# Patient Record
Sex: FEMALE | Race: WHITE | NOT HISPANIC OR LATINO | Employment: FULL TIME | URBAN - METROPOLITAN AREA
[De-identification: names, ages, dates, MRNs, and addresses within clinical notes are randomized per-mention and may not be internally consistent; named-entity substitution may affect disease eponyms.]

---

## 2017-03-29 ENCOUNTER — COMMUNICATION - HEALTHEAST (OUTPATIENT)
Dept: SCHEDULING | Facility: CLINIC | Age: 32
End: 2017-03-29

## 2017-04-04 ENCOUNTER — COMMUNICATION - HEALTHEAST (OUTPATIENT)
Dept: FAMILY MEDICINE | Facility: CLINIC | Age: 32
End: 2017-04-04

## 2017-04-17 ENCOUNTER — COMMUNICATION - HEALTHEAST (OUTPATIENT)
Dept: FAMILY MEDICINE | Facility: CLINIC | Age: 32
End: 2017-04-17

## 2017-04-17 ENCOUNTER — OFFICE VISIT - HEALTHEAST (OUTPATIENT)
Dept: FAMILY MEDICINE | Facility: CLINIC | Age: 32
End: 2017-04-17

## 2017-04-17 ENCOUNTER — HOSPITAL ENCOUNTER (OUTPATIENT)
Dept: ULTRASOUND IMAGING | Facility: HOSPITAL | Age: 32
Discharge: HOME | End: 2017-04-17
Attending: FAMILY MEDICINE

## 2017-04-17 DIAGNOSIS — Z32.00 POSSIBLE PREGNANCY: ICD-10-CM

## 2017-04-17 ASSESSMENT — MIFFLIN-ST. JEOR: SCORE: 1283.47

## 2017-04-20 LAB
HPV INTERPRETATION - HISTORICAL: NORMAL
HPV INTERPRETER - HISTORICAL: NORMAL

## 2017-04-24 LAB
BKR LAB AP ABNORMAL BLEEDING: NO
BKR LAB AP BIRTH CONTROL/HORMONES: ABNORMAL
BKR LAB AP CERVICAL APPEARANCE: NORMAL
BKR LAB AP GYN ADEQUACY: ABNORMAL
BKR LAB AP GYN INTERPRETATION: ABNORMAL
BKR LAB AP HPV REFLEX: ABNORMAL
BKR LAB AP LMP: ABNORMAL
BKR LAB AP PATIENT STATUS: ABNORMAL
BKR LAB AP PREVIOUS ABNORMAL: ABNORMAL
BKR LAB AP PREVIOUS NORMAL: 2013
HIGH RISK?: NO
INTERPRETING LAB: ABNORMAL
PATH REPORT.COMMENTS IMP SPEC: ABNORMAL
RESULT FLAG (HE HISTORICAL CONVERSION): ABNORMAL

## 2017-04-25 ENCOUNTER — COMMUNICATION - HEALTHEAST (OUTPATIENT)
Dept: FAMILY MEDICINE | Facility: CLINIC | Age: 32
End: 2017-04-25

## 2017-04-25 ENCOUNTER — HOSPITAL ENCOUNTER (OUTPATIENT)
Dept: ULTRASOUND IMAGING | Facility: CLINIC | Age: 32
Discharge: HOME | End: 2017-04-25
Attending: FAMILY MEDICINE

## 2017-04-25 DIAGNOSIS — Z32.00 POSSIBLE PREGNANCY: ICD-10-CM

## 2017-05-03 ENCOUNTER — OFFICE VISIT - HEALTHEAST (OUTPATIENT)
Dept: FAMILY MEDICINE | Facility: CLINIC | Age: 32
End: 2017-05-03

## 2017-05-03 DIAGNOSIS — O03.4 INCOMPLETE MISCARRIAGE: ICD-10-CM

## 2017-05-15 ENCOUNTER — COMMUNICATION - HEALTHEAST (OUTPATIENT)
Dept: FAMILY MEDICINE | Facility: CLINIC | Age: 32
End: 2017-05-15

## 2017-12-18 ENCOUNTER — COMMUNICATION - HEALTHEAST (OUTPATIENT)
Dept: FAMILY MEDICINE | Facility: CLINIC | Age: 32
End: 2017-12-18

## 2017-12-18 DIAGNOSIS — Z30.9 CONTRACEPTION MANAGEMENT: ICD-10-CM

## 2017-12-18 DIAGNOSIS — N94.3 PREMENSTRUAL TENSION SYNDROME: ICD-10-CM

## 2017-12-20 ENCOUNTER — COMMUNICATION - HEALTHEAST (OUTPATIENT)
Dept: FAMILY MEDICINE | Facility: CLINIC | Age: 32
End: 2017-12-20

## 2017-12-20 DIAGNOSIS — B00.9 HERPES SIMPLEX VIRUS (HSV) INFECTION: ICD-10-CM

## 2018-01-19 ENCOUNTER — OFFICE VISIT - HEALTHEAST (OUTPATIENT)
Dept: FAMILY MEDICINE | Facility: CLINIC | Age: 33
End: 2018-01-19

## 2018-01-19 DIAGNOSIS — B00.9 HERPES SIMPLEX VIRUS (HSV) INFECTION: ICD-10-CM

## 2018-03-01 ENCOUNTER — COMMUNICATION - HEALTHEAST (OUTPATIENT)
Dept: FAMILY MEDICINE | Facility: CLINIC | Age: 33
End: 2018-03-01

## 2018-03-08 ENCOUNTER — AMBULATORY - HEALTHEAST (OUTPATIENT)
Dept: FAMILY MEDICINE | Facility: CLINIC | Age: 33
End: 2018-03-08

## 2018-03-08 DIAGNOSIS — Z32.00 POSSIBLE PREGNANCY, NOT CONFIRMED: ICD-10-CM

## 2018-03-12 ENCOUNTER — OFFICE VISIT - HEALTHEAST (OUTPATIENT)
Dept: FAMILY MEDICINE | Facility: CLINIC | Age: 33
End: 2018-03-12

## 2018-03-12 DIAGNOSIS — Z34.90 EARLY STAGE OF PREGNANCY: ICD-10-CM

## 2018-03-12 DIAGNOSIS — Z32.00 POSSIBLE PREGNANCY, NOT CONFIRMED: ICD-10-CM

## 2018-03-12 LAB
HCG SERPL-ACNC: 1964 MLU/ML (ref 0–4)
HCG UR QL: POSITIVE

## 2018-03-13 ENCOUNTER — COMMUNICATION - HEALTHEAST (OUTPATIENT)
Dept: FAMILY MEDICINE | Facility: CLINIC | Age: 33
End: 2018-03-13

## 2018-03-16 ENCOUNTER — AMBULATORY - HEALTHEAST (OUTPATIENT)
Dept: LAB | Facility: CLINIC | Age: 33
End: 2018-03-16

## 2018-03-16 DIAGNOSIS — Z34.90 EARLY STAGE OF PREGNANCY: ICD-10-CM

## 2018-03-16 LAB — HCG SERPL-ACNC: 2572 MLU/ML (ref 0–4)

## 2018-03-19 ENCOUNTER — AMBULATORY - HEALTHEAST (OUTPATIENT)
Dept: FAMILY MEDICINE | Facility: CLINIC | Age: 33
End: 2018-03-19

## 2018-03-19 ENCOUNTER — AMBULATORY - HEALTHEAST (OUTPATIENT)
Dept: LAB | Facility: CLINIC | Age: 33
End: 2018-03-19

## 2018-03-19 ENCOUNTER — COMMUNICATION - HEALTHEAST (OUTPATIENT)
Dept: FAMILY MEDICINE | Facility: CLINIC | Age: 33
End: 2018-03-19

## 2018-03-19 DIAGNOSIS — Z34.90 PREGNANCY: ICD-10-CM

## 2018-03-19 DIAGNOSIS — O20.0 THREATENED ABORTION: ICD-10-CM

## 2018-03-19 LAB — HCG SERPL-ACNC: 3196 MLU/ML (ref 0–4)

## 2018-03-20 ENCOUNTER — AMBULATORY - HEALTHEAST (OUTPATIENT)
Dept: FAMILY MEDICINE | Facility: CLINIC | Age: 33
End: 2018-03-20

## 2018-03-20 DIAGNOSIS — O20.0 THREATENED ABORTION: ICD-10-CM

## 2018-03-21 ENCOUNTER — COMMUNICATION - HEALTHEAST (OUTPATIENT)
Dept: FAMILY MEDICINE | Facility: CLINIC | Age: 33
End: 2018-03-21

## 2018-03-31 ENCOUNTER — AMBULATORY - HEALTHEAST (OUTPATIENT)
Dept: FAMILY MEDICINE | Facility: CLINIC | Age: 33
End: 2018-03-31

## 2018-03-31 ENCOUNTER — COMMUNICATION - HEALTHEAST (OUTPATIENT)
Dept: FAMILY MEDICINE | Facility: CLINIC | Age: 33
End: 2018-03-31

## 2018-03-31 DIAGNOSIS — O20.0 THREATENED ABORTION: ICD-10-CM

## 2018-04-02 ENCOUNTER — COMMUNICATION - HEALTHEAST (OUTPATIENT)
Dept: FAMILY MEDICINE | Facility: CLINIC | Age: 33
End: 2018-04-02

## 2018-04-02 ENCOUNTER — AMBULATORY - HEALTHEAST (OUTPATIENT)
Dept: SCHEDULING | Facility: CLINIC | Age: 33
End: 2018-04-02

## 2018-04-02 DIAGNOSIS — O20.0 THREATENED ABORTION: ICD-10-CM

## 2018-04-11 ENCOUNTER — OFFICE VISIT - HEALTHEAST (OUTPATIENT)
Dept: FAMILY MEDICINE | Facility: CLINIC | Age: 33
End: 2018-04-11

## 2018-04-11 DIAGNOSIS — O03.9 MISCARRIAGE: ICD-10-CM

## 2018-04-11 LAB
HCG SERPL-ACNC: 130 MLU/ML (ref 0–4)
PROLACTIN SERPL-MCNC: 10.3 NG/ML (ref 0–20)
T4 FREE SERPL-MCNC: 1 NG/DL (ref 0.7–1.8)
TSH SERPL DL<=0.005 MIU/L-ACNC: 1.4 UIU/ML (ref 0.3–5)

## 2018-04-23 ENCOUNTER — AMBULATORY - HEALTHEAST (OUTPATIENT)
Dept: LAB | Facility: CLINIC | Age: 33
End: 2018-04-23

## 2018-04-23 DIAGNOSIS — O03.9 MISCARRIAGE: ICD-10-CM

## 2018-04-23 LAB — HCG SERPL-ACNC: 2 MLU/ML (ref 0–4)

## 2018-06-03 ENCOUNTER — COMMUNICATION - HEALTHEAST (OUTPATIENT)
Dept: FAMILY MEDICINE | Facility: CLINIC | Age: 33
End: 2018-06-03

## 2018-06-28 ENCOUNTER — COMMUNICATION - HEALTHEAST (OUTPATIENT)
Dept: FAMILY MEDICINE | Facility: CLINIC | Age: 33
End: 2018-06-28

## 2018-07-20 ENCOUNTER — PRENATAL OFFICE VISIT - HEALTHEAST (OUTPATIENT)
Dept: FAMILY MEDICINE | Facility: CLINIC | Age: 33
End: 2018-07-20

## 2018-07-20 DIAGNOSIS — Z32.00 POSSIBLE PREGNANCY: ICD-10-CM

## 2018-07-20 DIAGNOSIS — Z34.81 ENCOUNTER FOR SUPERVISION OF OTHER NORMAL PREGNANCY IN FIRST TRIMESTER: ICD-10-CM

## 2018-07-20 LAB
ALBUMIN UR-MCNC: NEGATIVE MG/DL
APPEARANCE UR: ABNORMAL
BASOPHILS # BLD AUTO: 0.1 THOU/UL (ref 0–0.2)
BASOPHILS NFR BLD AUTO: 0 % (ref 0–2)
BILIRUB UR QL STRIP: NEGATIVE
CLUE CELLS: ABNORMAL
COLOR UR AUTO: YELLOW
EOSINOPHIL # BLD AUTO: 0 THOU/UL (ref 0–0.4)
EOSINOPHIL NFR BLD AUTO: 0 % (ref 0–6)
ERYTHROCYTE [DISTWIDTH] IN BLOOD BY AUTOMATED COUNT: 12.1 % (ref 11–14.5)
GLUCOSE UR STRIP-MCNC: NEGATIVE MG/DL
HCG UR QL: POSITIVE
HCT VFR BLD AUTO: 39.1 % (ref 35–47)
HGB BLD-MCNC: 13.6 G/DL (ref 12–16)
HGB UR QL STRIP: ABNORMAL
HIV 1+2 AB+HIV1 P24 AG SERPL QL IA: NEGATIVE
KETONES UR STRIP-MCNC: NEGATIVE MG/DL
LEUKOCYTE ESTERASE UR QL STRIP: NEGATIVE
LYMPHOCYTES # BLD AUTO: 2.4 THOU/UL (ref 0.8–4.4)
LYMPHOCYTES NFR BLD AUTO: 19 % (ref 20–40)
MCH RBC QN AUTO: 32.2 PG (ref 27–34)
MCHC RBC AUTO-ENTMCNC: 34.8 G/DL (ref 32–36)
MCV RBC AUTO: 93 FL (ref 80–100)
MONOCYTES # BLD AUTO: 0.6 THOU/UL (ref 0–0.9)
MONOCYTES NFR BLD AUTO: 4 % (ref 2–10)
NEUTROPHILS # BLD AUTO: 9.5 THOU/UL (ref 2–7.7)
NEUTROPHILS NFR BLD AUTO: 76 % (ref 50–70)
NITRATE UR QL: NEGATIVE
PH UR STRIP: 8.5 [PH] (ref 5–8)
PLATELET # BLD AUTO: 354 THOU/UL (ref 140–440)
PMV BLD AUTO: 10 FL (ref 8.5–12.5)
RBC # BLD AUTO: 4.22 MILL/UL (ref 3.8–5.4)
SP GR UR STRIP: 1.02 (ref 1–1.03)
TRICHOMONAS, WET PREP: ABNORMAL
UROBILINOGEN UR STRIP-ACNC: ABNORMAL
WBC: 12.5 THOU/UL (ref 4–11)
YEAST, WET PREP: ABNORMAL

## 2018-07-21 LAB
ANTIBODY SCREEN: NEGATIVE
BACTERIA SPEC CULT: NO GROWTH
HBV SURFACE AG SERPL QL IA: NEGATIVE
T PALLIDUM AB SER QL: NEGATIVE

## 2018-07-23 LAB
ABO/RH(D): NORMAL
ABORH REPEAT: NORMAL
C TRACH DNA SPEC QL PROBE+SIG AMP: NEGATIVE
N GONORRHOEA DNA SPEC QL NAA+PROBE: NEGATIVE
RUBV IGG SERPL QL IA: POSITIVE

## 2018-07-30 ENCOUNTER — RECORDS - HEALTHEAST (OUTPATIENT)
Dept: ADMINISTRATIVE | Facility: OTHER | Age: 33
End: 2018-07-30

## 2018-08-29 ENCOUNTER — PRENATAL OFFICE VISIT - HEALTHEAST (OUTPATIENT)
Dept: FAMILY MEDICINE | Facility: CLINIC | Age: 33
End: 2018-08-29

## 2018-08-29 DIAGNOSIS — Z34.81 ENCOUNTER FOR SUPERVISION OF OTHER NORMAL PREGNANCY IN FIRST TRIMESTER: ICD-10-CM

## 2018-08-29 LAB
ALBUMIN UR-MCNC: NEGATIVE MG/DL
GLUCOSE UR STRIP-MCNC: NEGATIVE MG/DL
KETONES UR STRIP-MCNC: NEGATIVE MG/DL

## 2018-08-31 LAB
AFP MOM: 1.62 MOM
ALPHA FETO PROTEIN: 62 NG/ML
CALCULATED AGE AT EDD: NORMAL
COLLECTION DATE: NORMAL
CURRENT CIGARETTE SMOKING STATUS: NORMAL
EDD BY LMP: NORMAL
GA ON COLLECTION BY DATES: NORMAL WK,D
GA USED IN RISK ESTIMATE: NORMAL
GENERAL TEST INFORMATION: NORMAL
INITIAL OR REPEAT TESTING: NORMAL
INSULIN DIABETIC: NO
INTERPRETATION: NORMAL
IVF PREGNANCY: NO
Lab: NORMAL
NEURAL TUBE DEFECT RISK ESTIMATE: NORMAL
NUMBER OF CHORIONS: NORMAL
NUMBER OF FETUSES:: 1
PATIENT OR FATHER OF BABY HAS A NTD: NO
PATIENT WEIGHT: 142 LBS
PHYSICIAN PHONE NUMBER: NORMAL
PREV PREGNANCY W/ NEURAL TUBE DEFECT: NO
PT DATE OF BIRTH: NORMAL
RACE OF MOTHER: NORMAL
RECOMMENDED FOLLOW UP: NORMAL

## 2018-09-04 ENCOUNTER — COMMUNICATION - HEALTHEAST (OUTPATIENT)
Dept: FAMILY MEDICINE | Facility: CLINIC | Age: 33
End: 2018-09-04

## 2018-09-04 ENCOUNTER — COMMUNICATION - HEALTHEAST (OUTPATIENT)
Dept: SCHEDULING | Facility: CLINIC | Age: 33
End: 2018-09-04

## 2018-09-17 ENCOUNTER — PRENATAL OFFICE VISIT - HEALTHEAST (OUTPATIENT)
Dept: FAMILY MEDICINE | Facility: CLINIC | Age: 33
End: 2018-09-17

## 2018-09-17 DIAGNOSIS — Z34.81 ENCOUNTER FOR SUPERVISION OF OTHER NORMAL PREGNANCY IN FIRST TRIMESTER: ICD-10-CM

## 2018-09-24 ENCOUNTER — RECORDS - HEALTHEAST (OUTPATIENT)
Dept: ADMINISTRATIVE | Facility: OTHER | Age: 33
End: 2018-09-24

## 2018-10-10 ENCOUNTER — COMMUNICATION - HEALTHEAST (OUTPATIENT)
Dept: FAMILY MEDICINE | Facility: CLINIC | Age: 33
End: 2018-10-10

## 2018-10-15 ENCOUNTER — PRENATAL OFFICE VISIT - HEALTHEAST (OUTPATIENT)
Dept: FAMILY MEDICINE | Facility: CLINIC | Age: 33
End: 2018-10-15

## 2018-10-15 DIAGNOSIS — Z34.81 ENCOUNTER FOR SUPERVISION OF OTHER NORMAL PREGNANCY IN FIRST TRIMESTER: ICD-10-CM

## 2018-11-12 ENCOUNTER — PRENATAL OFFICE VISIT - HEALTHEAST (OUTPATIENT)
Dept: FAMILY MEDICINE | Facility: CLINIC | Age: 33
End: 2018-11-12

## 2018-11-12 DIAGNOSIS — Z34.81 ENCOUNTER FOR SUPERVISION OF OTHER NORMAL PREGNANCY IN FIRST TRIMESTER: ICD-10-CM

## 2018-11-12 LAB
ALBUMIN UR-MCNC: NEGATIVE MG/DL
BASOPHILS # BLD AUTO: 0.1 THOU/UL (ref 0–0.2)
BASOPHILS NFR BLD AUTO: 0 % (ref 0–2)
EOSINOPHIL # BLD AUTO: 0 THOU/UL (ref 0–0.4)
EOSINOPHIL NFR BLD AUTO: 0 % (ref 0–6)
ERYTHROCYTE [DISTWIDTH] IN BLOOD BY AUTOMATED COUNT: 12.5 % (ref 11–14.5)
FASTING STATUS PATIENT QL REPORTED: ABNORMAL
GLUCOSE 1H P 50 G GLC PO SERPL-MCNC: 169 MG/DL (ref 70–139)
GLUCOSE UR STRIP-MCNC: NEGATIVE MG/DL
HCT VFR BLD AUTO: 34 % (ref 35–47)
HGB BLD-MCNC: 11.3 G/DL (ref 12–16)
KETONES UR STRIP-MCNC: NEGATIVE MG/DL
LYMPHOCYTES # BLD AUTO: 2.6 THOU/UL (ref 0.8–4.4)
LYMPHOCYTES NFR BLD AUTO: 17 % (ref 20–40)
MCH RBC QN AUTO: 32.2 PG (ref 27–34)
MCHC RBC AUTO-ENTMCNC: 33.2 G/DL (ref 32–36)
MCV RBC AUTO: 97 FL (ref 80–100)
MONOCYTES # BLD AUTO: 0.7 THOU/UL (ref 0–0.9)
MONOCYTES NFR BLD AUTO: 4 % (ref 2–10)
NEUTROPHILS # BLD AUTO: 12.3 THOU/UL (ref 2–7.7)
NEUTROPHILS NFR BLD AUTO: 79 % (ref 50–70)
PLATELET # BLD AUTO: 293 THOU/UL (ref 140–440)
PMV BLD AUTO: 10.3 FL (ref 8.5–12.5)
RBC # BLD AUTO: 3.51 MILL/UL (ref 3.8–5.4)
WBC: 15.8 THOU/UL (ref 4–11)

## 2018-11-13 LAB — T PALLIDUM AB SER QL: NEGATIVE

## 2018-11-21 ENCOUNTER — AMBULATORY - HEALTHEAST (OUTPATIENT)
Dept: LAB | Facility: CLINIC | Age: 33
End: 2018-11-21

## 2018-11-21 DIAGNOSIS — Z34.81 ENCOUNTER FOR SUPERVISION OF OTHER NORMAL PREGNANCY IN FIRST TRIMESTER: ICD-10-CM

## 2018-11-21 LAB
FASTING STATUS PATIENT QL REPORTED: YES
GLUCOSE 1H P 100 G GLC PO SERPL-MCNC: 191 MG/DL
GLUCOSE 2H P 100 G GLC PO SERPL-MCNC: 182 MG/DL
GLUCOSE 3H P 100 G GLC PO SERPL-MCNC: 88 MG/DL
GLUCOSE P FAST SERPL-MCNC: 77 MG/DL

## 2018-11-23 ENCOUNTER — COMMUNICATION - HEALTHEAST (OUTPATIENT)
Dept: ADMINISTRATIVE | Facility: CLINIC | Age: 33
End: 2018-11-23

## 2018-11-26 ENCOUNTER — COMMUNICATION - HEALTHEAST (OUTPATIENT)
Dept: FAMILY MEDICINE | Facility: CLINIC | Age: 33
End: 2018-11-26

## 2018-12-03 ENCOUNTER — TELEPHONE (OUTPATIENT)
Dept: ENDOCRINOLOGY | Facility: CLINIC | Age: 33
End: 2018-12-03

## 2018-12-03 ENCOUNTER — PRENATAL OFFICE VISIT - HEALTHEAST (OUTPATIENT)
Dept: FAMILY MEDICINE | Facility: CLINIC | Age: 33
End: 2018-12-03

## 2018-12-03 DIAGNOSIS — Z34.81 ENCOUNTER FOR SUPERVISION OF OTHER NORMAL PREGNANCY IN FIRST TRIMESTER: ICD-10-CM

## 2018-12-03 DIAGNOSIS — O24.419 GESTATIONAL DIABETES MELLITUS (GDM), ANTEPARTUM, GESTATIONAL DIABETES METHOD OF CONTROL UNSPECIFIED: ICD-10-CM

## 2018-12-03 NOTE — TELEPHONE ENCOUNTER
[facility]    Phone Message    May a detailed message be left on voicemail: yes    Reason for Call: A nurse from [facility] and tried to schedule the pt in the [facility] for gestational diabetes. Pt does have a referral from [facility] and is faxed to the clinic. Please call back and schedule the pt asap. Thanks.     Action Taken: Message routed to:  [facility] ([facility] [place]

## 2018-12-05 NOTE — TELEPHONE ENCOUNTER
Patient called me back and says she got an appt early next week that she's going to keep. Declined scheduling.

## 2018-12-11 ENCOUNTER — AMBULATORY - HEALTHEAST (OUTPATIENT)
Dept: EDUCATION SERVICES | Facility: CLINIC | Age: 33
End: 2018-12-11

## 2018-12-11 DIAGNOSIS — O24.410 DIET CONTROLLED GESTATIONAL DIABETES MELLITUS (GDM) IN THIRD TRIMESTER: ICD-10-CM

## 2018-12-17 ENCOUNTER — PRENATAL OFFICE VISIT - HEALTHEAST (OUTPATIENT)
Dept: FAMILY MEDICINE | Facility: CLINIC | Age: 33
End: 2018-12-17

## 2018-12-17 DIAGNOSIS — O24.419 GESTATIONAL DIABETES MELLITUS (GDM), ANTEPARTUM, GESTATIONAL DIABETES METHOD OF CONTROL UNSPECIFIED: ICD-10-CM

## 2018-12-17 DIAGNOSIS — Z34.81 ENCOUNTER FOR SUPERVISION OF OTHER NORMAL PREGNANCY IN FIRST TRIMESTER: ICD-10-CM

## 2018-12-19 ENCOUNTER — AMBULATORY - HEALTHEAST (OUTPATIENT)
Dept: EDUCATION SERVICES | Facility: CLINIC | Age: 33
End: 2018-12-19

## 2018-12-19 DIAGNOSIS — O24.419 GESTATIONAL DIABETES: ICD-10-CM

## 2018-12-24 ENCOUNTER — COMMUNICATION - HEALTHEAST (OUTPATIENT)
Dept: EDUCATION SERVICES | Facility: CLINIC | Age: 33
End: 2018-12-24

## 2019-01-03 ENCOUNTER — PRENATAL OFFICE VISIT - HEALTHEAST (OUTPATIENT)
Dept: FAMILY MEDICINE | Facility: CLINIC | Age: 34
End: 2019-01-03

## 2019-01-03 DIAGNOSIS — Z34.81 ENCOUNTER FOR SUPERVISION OF OTHER NORMAL PREGNANCY IN FIRST TRIMESTER: ICD-10-CM

## 2019-01-03 DIAGNOSIS — O24.419 GESTATIONAL DIABETES MELLITUS (GDM), ANTEPARTUM, GESTATIONAL DIABETES METHOD OF CONTROL UNSPECIFIED: ICD-10-CM

## 2019-01-09 ENCOUNTER — AMBULATORY - HEALTHEAST (OUTPATIENT)
Dept: EDUCATION SERVICES | Facility: CLINIC | Age: 34
End: 2019-01-09

## 2019-01-09 DIAGNOSIS — O24.410 DIET CONTROLLED GESTATIONAL DIABETES MELLITUS (GDM) IN THIRD TRIMESTER: ICD-10-CM

## 2019-01-14 ENCOUNTER — PRENATAL OFFICE VISIT - HEALTHEAST (OUTPATIENT)
Dept: FAMILY MEDICINE | Facility: CLINIC | Age: 34
End: 2019-01-14

## 2019-01-14 ENCOUNTER — COMMUNICATION - HEALTHEAST (OUTPATIENT)
Dept: FAMILY MEDICINE | Facility: CLINIC | Age: 34
End: 2019-01-14

## 2019-01-14 DIAGNOSIS — Z34.81 ENCOUNTER FOR SUPERVISION OF OTHER NORMAL PREGNANCY IN FIRST TRIMESTER: ICD-10-CM

## 2019-01-14 DIAGNOSIS — O24.419 GESTATIONAL DIABETES MELLITUS (GDM), ANTEPARTUM, GESTATIONAL DIABETES METHOD OF CONTROL UNSPECIFIED: ICD-10-CM

## 2019-01-16 ENCOUNTER — HOSPITAL ENCOUNTER (OUTPATIENT)
Dept: ULTRASOUND IMAGING | Facility: HOSPITAL | Age: 34
Discharge: HOME | End: 2019-01-16
Attending: FAMILY MEDICINE

## 2019-01-16 ENCOUNTER — HOSPITAL ENCOUNTER (OUTPATIENT)
Dept: OBGYN | Facility: HOSPITAL | Age: 34
Discharge: HOME | End: 2019-01-16
Attending: FAMILY MEDICINE | Admitting: FAMILY MEDICINE

## 2019-01-16 LAB
ALLERGIC TO PENICILLIN: NO
GP B STREP DNA SPEC QL NAA+PROBE: NEGATIVE

## 2019-01-16 ASSESSMENT — MIFFLIN-ST. JEOR: SCORE: 1406.22

## 2019-01-17 ENCOUNTER — RECORDS - HEALTHEAST (OUTPATIENT)
Dept: ADMINISTRATIVE | Facility: OTHER | Age: 34
End: 2019-01-17

## 2019-01-21 ENCOUNTER — HOSPITAL ENCOUNTER (OUTPATIENT)
Dept: OBGYN | Facility: HOSPITAL | Age: 34
Discharge: HOME | End: 2019-01-21
Attending: FAMILY MEDICINE | Admitting: OBSTETRICS & GYNECOLOGY

## 2019-01-21 ENCOUNTER — PRENATAL OFFICE VISIT - HEALTHEAST (OUTPATIENT)
Dept: FAMILY MEDICINE | Facility: CLINIC | Age: 34
End: 2019-01-21

## 2019-01-21 DIAGNOSIS — O24.419 GESTATIONAL DIABETES MELLITUS (GDM), ANTEPARTUM, GESTATIONAL DIABETES METHOD OF CONTROL UNSPECIFIED: ICD-10-CM

## 2019-01-21 DIAGNOSIS — Z34.81 ENCOUNTER FOR SUPERVISION OF OTHER NORMAL PREGNANCY IN FIRST TRIMESTER: ICD-10-CM

## 2019-01-21 LAB
ALBUMIN UR-MCNC: NEGATIVE MG/DL
GLUCOSE UR STRIP-MCNC: ABNORMAL MG/DL
KETONES UR STRIP-MCNC: NEGATIVE MG/DL

## 2019-01-21 ASSESSMENT — MIFFLIN-ST. JEOR: SCORE: 1410.76

## 2019-01-28 ENCOUNTER — PRENATAL OFFICE VISIT - HEALTHEAST (OUTPATIENT)
Dept: FAMILY MEDICINE | Facility: CLINIC | Age: 34
End: 2019-01-28

## 2019-01-28 DIAGNOSIS — Z34.81 ENCOUNTER FOR SUPERVISION OF OTHER NORMAL PREGNANCY IN FIRST TRIMESTER: ICD-10-CM

## 2019-01-28 ASSESSMENT — MIFFLIN-ST. JEOR: SCORE: 1424.36

## 2019-01-31 ENCOUNTER — COMMUNICATION - HEALTHEAST (OUTPATIENT)
Dept: FAMILY MEDICINE | Facility: CLINIC | Age: 34
End: 2019-01-31

## 2019-01-31 DIAGNOSIS — J01.90 ACUTE SINUSITIS WITH SYMPTOMS > 10 DAYS: ICD-10-CM

## 2019-02-04 ENCOUNTER — PRENATAL OFFICE VISIT - HEALTHEAST (OUTPATIENT)
Dept: FAMILY MEDICINE | Facility: CLINIC | Age: 34
End: 2019-02-04

## 2019-02-04 DIAGNOSIS — O24.419 GESTATIONAL DIABETES MELLITUS (GDM), ANTEPARTUM, GESTATIONAL DIABETES METHOD OF CONTROL UNSPECIFIED: ICD-10-CM

## 2019-02-04 DIAGNOSIS — Z34.81 ENCOUNTER FOR SUPERVISION OF OTHER NORMAL PREGNANCY IN FIRST TRIMESTER: ICD-10-CM

## 2019-02-04 LAB
ALBUMIN UR-MCNC: ABNORMAL MG/DL
GLUCOSE UR STRIP-MCNC: NEGATIVE MG/DL
KETONES UR STRIP-MCNC: NEGATIVE MG/DL

## 2019-02-04 ASSESSMENT — MIFFLIN-ST. JEOR: SCORE: 1419.83

## 2019-02-07 ENCOUNTER — SURGERY - HEALTHEAST (OUTPATIENT)
Dept: OBGYN | Facility: HOSPITAL | Age: 34
End: 2019-02-07

## 2019-02-07 ENCOUNTER — ANESTHESIA - HEALTHEAST (OUTPATIENT)
Dept: OBGYN | Facility: HOSPITAL | Age: 34
End: 2019-02-07

## 2019-02-07 ASSESSMENT — MIFFLIN-ST. JEOR: SCORE: 1440.24

## 2019-02-08 ENCOUNTER — HOME CARE/HOSPICE - HEALTHEAST (OUTPATIENT)
Dept: HOME HEALTH SERVICES | Facility: HOME HEALTH | Age: 34
End: 2019-02-08

## 2019-02-12 ENCOUNTER — HOME CARE/HOSPICE - HEALTHEAST (OUTPATIENT)
Dept: HOME HEALTH SERVICES | Facility: HOME HEALTH | Age: 34
End: 2019-02-12

## 2019-02-21 ENCOUNTER — RECORDS - HEALTHEAST (OUTPATIENT)
Dept: ADMINISTRATIVE | Facility: OTHER | Age: 34
End: 2019-02-21

## 2019-03-18 ENCOUNTER — COMMUNICATION - HEALTHEAST (OUTPATIENT)
Dept: FAMILY MEDICINE | Facility: CLINIC | Age: 34
End: 2019-03-18

## 2019-03-19 ENCOUNTER — OFFICE VISIT - HEALTHEAST (OUTPATIENT)
Dept: FAMILY MEDICINE | Facility: CLINIC | Age: 34
End: 2019-03-19

## 2019-03-19 DIAGNOSIS — H66.001 ACUTE SUPPURATIVE OTITIS MEDIA OF RIGHT EAR WITHOUT SPONTANEOUS RUPTURE OF TYMPANIC MEMBRANE, RECURRENCE NOT SPECIFIED: ICD-10-CM

## 2019-03-19 DIAGNOSIS — J02.0 STREPTOCOCCAL PHARYNGITIS: ICD-10-CM

## 2019-03-19 DIAGNOSIS — H10.31 ACUTE BACTERIAL CONJUNCTIVITIS OF RIGHT EYE: ICD-10-CM

## 2019-03-25 ENCOUNTER — OFFICE VISIT - HEALTHEAST (OUTPATIENT)
Dept: FAMILY MEDICINE | Facility: CLINIC | Age: 34
End: 2019-03-25

## 2019-03-25 DIAGNOSIS — D62 ANEMIA DUE TO BLOOD LOSS, ACUTE: ICD-10-CM

## 2019-03-25 LAB — HGB BLD-MCNC: 11.1 G/DL (ref 12–16)

## 2019-03-25 RX ORDER — VALACYCLOVIR HYDROCHLORIDE 1 G/1
500 TABLET, FILM COATED ORAL DAILY
Status: SHIPPED | COMMUNITY
Start: 2019-03-25

## 2019-03-25 ASSESSMENT — MIFFLIN-ST. JEOR: SCORE: 1365.4

## 2019-06-02 ENCOUNTER — COMMUNICATION - HEALTHEAST (OUTPATIENT)
Dept: FAMILY MEDICINE | Facility: CLINIC | Age: 34
End: 2019-06-02

## 2019-06-02 DIAGNOSIS — B00.9 HERPES SIMPLEX VIRUS (HSV) INFECTION: ICD-10-CM

## 2019-06-05 RX ORDER — VALACYCLOVIR HYDROCHLORIDE 1 G/1
1000 TABLET, FILM COATED ORAL DAILY
Qty: 90 TABLET | Refills: 3 | Status: SHIPPED | OUTPATIENT
Start: 2019-06-05

## 2019-06-06 ENCOUNTER — COMMUNICATION - HEALTHEAST (OUTPATIENT)
Dept: SCHEDULING | Facility: CLINIC | Age: 34
End: 2019-06-06

## 2019-06-06 DIAGNOSIS — Z02.9 ENCOUNTERS FOR ADMINISTRATIVE PURPOSE: ICD-10-CM

## 2019-06-10 ENCOUNTER — AMBULATORY - HEALTHEAST (OUTPATIENT)
Dept: NURSING | Facility: CLINIC | Age: 34
End: 2019-06-10

## 2019-06-12 ENCOUNTER — AMBULATORY - HEALTHEAST (OUTPATIENT)
Dept: NURSING | Facility: CLINIC | Age: 34
End: 2019-06-12

## 2019-06-12 LAB
INDURATION - HISTORICAL: 0 MM
TB SKIN TEST - HISTORICAL: NEGATIVE

## 2021-05-27 NOTE — PROGRESS NOTES
"Patient presents for her routine postpartum exam:    Complications during delivery: Csection for breech.  GDM - diet controlled.    Bleeding: bleeding stopped 10-14 days after delivery with intermittent spotting.  Nothing for weeks.  Still taking prenatal.    Breastfeeding: Going well.  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  Supplements rarely with formula.  Healing: Going well.    Return to work: .    Plans for Contraception:  Thinking about IUD Mirena vs. Paragard.    PHQ9 : Brandon  Depression Scale EPDS Total Score: 3 (3/25/2019  1:03 PM)  Walking two miles a day with her dad at the [place].    Objective:  /62 (Patient Site: Right Arm, Patient Position: Sitting, Cuff Size: Adult Regular)   Pulse 100   Ht 5' 3\" (1.6 m)   Wt 153 lb 8 oz (69.6 kg)   LMP 2018 (Exact Date)   SpO2 99%   BMI 27.19 kg/m      General Appearance:    Alert, cooperative, no distress, appears stated age   Head:    Normocephalic, without obvious abnormality, atraumatic   Eyes:    PERRL, conjunctiva/corneas clear, EOM's intact, fundi     benign, both eyes   Ears:    Normal TM's and external ear canals, both ears   Nose:   Nares normal, septum midline, mucosa normal, no drainage    or sinus tenderness   Throat:   Lips, mucosa, and tongue normal; teeth and gums normal   Neck:   Supple, symmetrical, trachea midline, no adenopathy;     thyroid:  no enlargement/tenderness/nodules; no carotid    bruit or JVD   Back:     Symmetric, no curvature, ROM normal, no CVA tenderness   Lungs:     Clear to auscultation bilaterally, respirations unlabored   Chest Wall:    No tenderness or deformity    Heart:    Regular rate and rhythm, S1 and S2 normal, no murmur, rub   or gallop   Breast Exam:    No tenderness, masses, or nipple abnormality   Abdomen:     Soft, non-tender, bowel sounds active all four quadrants,     no masses, no organomegaly   Genitalia:     Uterus < 6 weeks, no adnexal tenderness   Rectal:    not " performed   Extremities:   Extremities normal, atraumatic, no cyanosis or edema   Pulses:   2+ and symmetric all extremities   Skin:   Skin color, texture, turgor normal, no rashes or lesions   Lymph nodes:   Cervical, supraclavicular, and axillary nodes normal   Neurologic:   CNII-XII intact, normal strength, sensation and reflexes     throughout         Assessment/Plan:    Routine Postpartum visit:  Patient is doing well and adjusting.  Patient does not have signs of postpartum depression.  Breastfeeding: is going well  Plans for contraception: thinking about which IUD.  Will notify me once she has decided.

## 2021-05-29 ENCOUNTER — HEALTH MAINTENANCE LETTER (OUTPATIENT)
Age: 36
End: 2021-05-29

## 2021-05-29 NOTE — TELEPHONE ENCOUNTER
New Appointment Needed  What is the reason for the visit:    Mantoux Placement  Appt Request  What is the purpose of the mantoux?:  Work: [employer] for [employer] for work  Is there a form to be completed?:   Yes  How soon do you need the mantoux placed?:  date: 6/21/2019    Provider Preference: Any available  How soon do you need to be seen?: within the next two weeks  Waitlist offered?: No  Okay to leave a detailed message:  Yes

## 2021-05-29 NOTE — TELEPHONE ENCOUNTER
RN cannot approve Refill Request    RN can NOT refill this medication overdue for office visits and/or labs.    Alex Fountain, [facility] Triage/Med Refill 6/3/2019    Requested Prescriptions   Pending Prescriptions Disp Refills     valACYclovir (VALTREX) 1000 MG tablet [Pharmacy Med Name: VALACYCLOVIR HCL 1 GRAM TABLET] 90 tablet 1     Sig: TAKE 1 TABLET (1,000 MG TOTAL) BY MOUTH DAILY FOR 2 DAYS.       Antivirals Refill Protocol Failed - 6/2/2019  7:25 AM        Failed - Renal function done in last year     Creatinine   Date Value Ref Range Status   12/06/2013 0.78 0.60 - 1.10 mg/dL Final             Failed - Visit with PCP or prescribing provider visit in past 12 months or next 3 months     Last office visit with prescriber/PCP: 4/11/2018 Malgorzata Bentley MD OR same dept: Visit date not found OR same specialty: 4/11/2018 Malgorzata Bentley MD  Last physical: Visit date not found Last MTM visit: Visit date not found   Next visit within 3 mo: Visit date not found  Next physical within 3 mo: Visit date not found  Prescriber OR PCP: Malgorzata Bentley MD  Last diagnosis associated with med order: There are no diagnoses linked to this encounter.  If protocol passes may refill for 12 months if within 3 months of last provider visit (or a total of 15 months).             Failed - Patient has not had positive pregnancy test in last 280 days     Beta hCG Qualitative   Date Value Ref Range Status   07/01/2015 Negative Negative Final     Pregnancy Test, Urine   Date Value Ref Range Status   07/20/2018 Positive (!) Negative Final     Beta-hCG, Quantitative   Date Value Ref Range Status   04/23/2018 2 0 - 4 mlU/mL Final             Passed - Patient does not have active pregnancy episode

## 2021-05-30 VITALS — HEIGHT: 63 IN | WEIGHT: 135.44 LBS | BODY MASS INDEX: 24 KG/M2

## 2021-05-30 VITALS — WEIGHT: 139.25 LBS | BODY MASS INDEX: 24.67 KG/M2

## 2021-05-31 VITALS — BODY MASS INDEX: 23.25 KG/M2 | WEIGHT: 131.25 LBS

## 2021-05-31 VITALS — WEIGHT: 135 LBS | BODY MASS INDEX: 23.91 KG/M2

## 2021-06-01 ENCOUNTER — RECORDS - HEALTHEAST (OUTPATIENT)
Dept: ADMINISTRATIVE | Facility: CLINIC | Age: 36
End: 2021-06-01

## 2021-06-01 VITALS — WEIGHT: 137 LBS | BODY MASS INDEX: 24.27 KG/M2

## 2021-06-01 VITALS — WEIGHT: 142 LBS | BODY MASS INDEX: 25.15 KG/M2

## 2021-06-01 VITALS — WEIGHT: 150 LBS | BODY MASS INDEX: 26.57 KG/M2

## 2021-06-01 VITALS — WEIGHT: 144 LBS | BODY MASS INDEX: 25.51 KG/M2

## 2021-06-01 VITALS — WEIGHT: 134 LBS | BODY MASS INDEX: 23.74 KG/M2

## 2021-06-02 ENCOUNTER — RECORDS - HEALTHEAST (OUTPATIENT)
Dept: ADMINISTRATIVE | Facility: CLINIC | Age: 36
End: 2021-06-02

## 2021-06-02 VITALS — HEIGHT: 63 IN | WEIGHT: 153.5 LBS | BODY MASS INDEX: 27.2 KG/M2

## 2021-06-02 VITALS — WEIGHT: 163.8 LBS | BODY MASS INDEX: 29.02 KG/M2

## 2021-06-02 VITALS — BODY MASS INDEX: 30.73 KG/M2 | HEIGHT: 62 IN | WEIGHT: 167 LBS

## 2021-06-02 VITALS — BODY MASS INDEX: 27.37 KG/M2 | WEIGHT: 154.5 LBS

## 2021-06-02 VITALS — WEIGHT: 154.5 LBS | BODY MASS INDEX: 27.37 KG/M2

## 2021-06-02 VITALS — BODY MASS INDEX: 30.55 KG/M2 | HEIGHT: 62 IN | WEIGHT: 166 LBS

## 2021-06-02 VITALS — HEIGHT: 62 IN | BODY MASS INDEX: 31.1 KG/M2 | WEIGHT: 169 LBS

## 2021-06-02 VITALS — WEIGHT: 153.1 LBS | BODY MASS INDEX: 27.12 KG/M2

## 2021-06-02 VITALS — WEIGHT: 156.2 LBS | BODY MASS INDEX: 27.67 KG/M2

## 2021-06-02 VITALS — HEIGHT: 62 IN | WEIGHT: 170 LBS | BODY MASS INDEX: 31.28 KG/M2

## 2021-06-02 VITALS — HEIGHT: 63 IN | BODY MASS INDEX: 30.12 KG/M2 | WEIGHT: 170 LBS

## 2021-06-02 VITALS — BODY MASS INDEX: 28.08 KG/M2 | WEIGHT: 158.5 LBS

## 2021-06-02 VITALS — BODY MASS INDEX: 28.7 KG/M2 | WEIGHT: 162 LBS

## 2021-06-02 VITALS — BODY MASS INDEX: 30.54 KG/M2 | WEIGHT: 167 LBS

## 2021-06-02 VITALS — WEIGHT: 166 LBS | BODY MASS INDEX: 29.41 KG/M2

## 2021-06-10 NOTE — PROGRESS NOTES
Patient is a 32-year-old female who presents today for follow-up of miscarriage.    She notes her last menstrual period was February 23, 2017.  Was an unexpected pregnancy, but she and her fiancé were quite happy about it.  They were planning on getting  in the fall 2018.  2 weeks ago, patient woke up with some light brown spotting.  She was seen by Dr. Douglas, who ordered labs as well as an ultrasound.  First ultrasound at 6 weeks showed a size dates discrepancy with a fetal heart rate of only 99 bpm.  There was also a moderate-sized subchorionic hemorrhage noted.  Because of these findings, Dr. Douglas had recommended a follow-up ultrasound in 1 week.  At that time, no fetal heart activity was noted an intrauterine fetal demise was documented.    Since then, patient has not had any further bleeding.  She is aware that miscarriage can be a natural process.  She want to talk about the pros and cons of waiting for miscarriage versus inducing with Cytotec.  She travels quite a bit for her work, so being able to time the miscarriage would be quite helpful for her.  Unfortunately, she does have family coming into town this weekend.      Patient has concerns that somehow she put this pregnancy at risk for miscarriage since she did not know that she was pregnant until 4-6 weeks end.  Discussed that this early on, miscarriages are quite common.  It occurs approximately 1 and 4 pregnancies.  Discussed the number one is likely genetic in nature.  She has no family history of recurrent miscarriage, coagulopathy, or genetic disorder.  Discussed that this miscarriage was not a product of anything she ate, drank, or with her activity.  Scuffs that at this time, no workup is necessary.    Objective:  /72 (Patient Site: Left Arm, Patient Position: Sitting)  Pulse 72  Wt 139 lb 4 oz (63.2 kg)  SpO2 99%  Breastfeeding? No  BMI 24.67 kg/m2  General appearance: alert, appears stated age and cooperative     Amara was  seen today for miscarriage and follow-up.    Diagnoses and all orders for this visit:    Incomplete miscarriage  -     miSOPROStol (CYTOTEC) 200 MCG tablet; Take 400 mcg at one time.  May repeat in 12 hours.    For now, patient is contemplating as to whether or not she would like to take the Cytotec.  She would like to have the prescription available in the case that she does decide to take it.  For now she plans on managing this naturally.  Discussed potential side effects of this medication.  We also discussed the potential failure rate. Discussed what to expect as far as miscarriage with regards to bleeding and clotting.  If she has heavier bleeding, or she feels lightheaded or palpitations, she should seek emergent care.  She and her fiancé are deciding on whether or not to attempt pregnancy again prior to the marriage or to wait until after marriage.  They plan on waiting, then she would like to restart on her oral contraceptive.  Discussed that if they plan to attempt pregnancy again, I would wait for a minimum of one normal cycle after miscarriage.    > 15 min spent with patient.  > 50% in counseling and coordination of care.

## 2021-06-10 NOTE — PROGRESS NOTES
Assessment/Plan:    Amara Son is a 31 y.o. female presenting for:    1. Possible pregnancy  Pregnancy test is positive today. Unclear etiology of the brownish discharge. Wet prep is negative. She does have a fairly friable cervix and this could potentially be the cause. Ultrasound was done to confirm viability. Ultrasound showed gestational age of six weeks and two days which is 9 days discordant from her suspected gestational age which was seven weeks and four days.   The radiologist remarked about some relative fetal bradycardia at 99 bpm  However given gestational age 99 beats per minute should be in the normal range. Additionally, there is a moderate sized sub chorionic hemorrhage.       I called the patient and discussed her results with her. Overall reassuring that heartbeat was found. Slightly concerning that the gestational age from the ultrasound and her fairly certain last menstrual period are bit discordant. She would like a repeat ultrasound in 1 to 2 weeks prior to her first OB appointment to ensure adequate fetal growth which I think is reasonable at this point.     The patient had her blood typing done today and was found to be O+.       We did not go over the phone prenatal packet today. She will follow up with her primary OB doctor for her pregnancy confirmation appointment.    - Pregnancy, Urine  - Wet Prep, Vaginal  - Gynecologic Cytology (PAP Smear)  - US OB < 14 Weeks With Transvaginal; Future  - Outpatient  Blood Typing (HML ABO/Rh Typing)  - HML Antibody Screen (OUTPATIENT)  - HM2(CBC w/o Differential)  - US OB < 14 Weeks With Transvaginal; Future  - HPV Cascade (PCR)    Medications Discontinued During This Encounter   Medication Reason     drospirenone-ethinyl estradiol (OCELLA) 3-0.03 mg per tablet Therapy completed     acyclovir (ZOVIRAX) 400 MG tablet Therapy completed           Chief Complaint:  Chief Complaint   Patient presents with     Possible Pregnancy     Had positive pregnacy  test on March 30th     Vaginal Discharge     Started on Sunday- tan/brown discharge- denies any clots- having some abd tightness/twinges       Subjective:   Amara Son  Is a pleasant 31-year-old female who is overall healthy presenting to the clinic today with concerns over some light spotting after a positive pregnancy test at home. The patient is currently engaged. She  Was also on oral contraceptive pills. Her mental cycles were fairly regular due to the pills. She states that she is not missed pills and she felt as though she was fairly good at taking them however her menstrual cycle was a bit late and she took a pregnancy test which was positive. Her and her fiancé are excited regarding the news after getting over the initial shock.     She had a positive pregnancy test on March 30.   She believes her last. Prior to that started on February 23, 2017. This places her at approximately 7 weeks and four days today.     She has an appointment with her primary care physician for a pregnancy confirmation however she presents the clinic today as she has had one day of some tan/brown discharge. She's not had any bright red blood. She states as this occurred after she went for a fairly vigorous walk. She's not had intercourse. No tampon use. No painful discharge. No urinary symptoms. The patient also had some slight abdominal tightness and twinges as well some mild menstrual type cramps yesterday. All of this is dissipated today but she's going out of town and fairly nervous.     She is unsure of her blood type. She is currently taking a prenatal vitamin. She is no longer taking her oral contraceptive pills.    12 point review of systems completed and negative except for what has been described above    History   Smoking Status     Never Smoker   Smokeless Tobacco     Never Used       Current Outpatient Prescriptions   Medication Sig Note     valACYclovir (VALTREX) 500 MG tablet Take 1 tablet (500 mg total) by mouth  "daily.      traZODone (DESYREL) 50 MG tablet TAKE 1 TABLET AT BEDTIME AS NEEDED FOR SLEEP. 1/8/2016: prn         Objective:  Vitals:    04/17/17 1416   BP: 118/64   Pulse: 76   Resp: 20   Temp: 98.8  F (37.1  C)   TempSrc: Oral   Weight: 135 lb 7 oz (61.4 kg)   Height: 5' 3\" (1.6 m)       Vital signs reviewed and stable  General: No acute distress  Psych: Appropriate affect  HEENT: moist mucous membranes, pupils equal, round, reactive to light and accomodation, posterior oropharynx clear of erythema or exudate, tympanic membranes are pearly grey bilaterally  Lymph: no cervical or supraclavicular lymphadenopathy  Cardiovascular: regular rate and rhythm with no murmur  Pulmonary: clear to auscultation bilaterally with no wheeze  Abdomen: soft, non tender, non distended with normo-active bowel sounds  Extremities: warm and well perfused with no edema  Skin: warm and dry with no rash   Genitourinary: normal appearing external female genitalia. Normal appearing cervix. Wet prep and Pap smear done today. Patient does have some slight bleeding From the surface of the cervix after swabbing for the Pap smear.       This note has been dictated and transcribed using voice recognition software.   Any errors in transcription are unintentional and inherent to the software.  "

## 2021-06-15 NOTE — PROGRESS NOTES
Patient is a 32-year-old female presents today for medication check.    She is taking Valtrex for recurrent herpes simplex.  Patient notes that her outbreaks currently appear on her back.  She is engaged to be , and she does not want to pass it onto her fiancé.  Notes that when she takes it daily, has very rare outbreaks.  She notes that the expense is somewhat difficult.  Will prescribe her 1000 mg tablets, and she can take half a tablet daily.    Patient also noted that she had stopped her birth control recently in attempt to get pregnant.  She is getting  in June of this year.  She underwent a miscarriage last year.  Answered all questions with regards to prenatal diet exercise and went to be seen in the case that she becomes pregnant again.    Objective     /68 (Patient Site: Left Arm, Patient Position: Sitting)  Pulse 95  Wt 131 lb 4 oz (59.5 kg)  LMP 01/02/2018  SpO2 98%  Breastfeeding? No  BMI 23.25 kg/m2  General appearance: alert, appears stated age and cooperative     Amara was seen today for medication management and medication refill.    Diagnoses and all orders for this visit:    Herpes Simplex  -   valACYclovir (VALTREX) 1000 MG tablet; Take 1 tablets (1,000 mg total) by mouth daily.  Discussed instructions for use.

## 2021-06-16 PROBLEM — D62 ANEMIA DUE TO BLOOD LOSS, ACUTE: Status: ACTIVE | Noted: 2019-02-10

## 2021-06-16 NOTE — PROGRESS NOTES
ASSESSMENT & PLAN      Amara was seen today for confirmation.    Diagnoses and all orders for this visit:    Early stage of pregnancy  -     Beta-hCG, Quantitative; Standing    Possible pregnancy, not confirmed  -     Pregnancy, Urine        No Follow-up on file.           CHIEF COMPLAINT: Amara Son had concerns including Confirmation.    [place]: 1.............. had concerns including Confirmation.    1. Early stage of pregnancy    2. Possible pregnancy, not confirmed      No problem-specific Assessment & Plan notes found for this encounter.      CC:            Amara presents today she is a G2 para 0010 last menstrual period 1/31/2018 due date 11/7/2018 multiple pregnancy tests at home are positive she recently stopped her birth control which makes sense of the timing  Let miscarriages at 6 week spontaneous  She is O+  She is taking a prenatal vitamin  [facility]  We talked about omega-3's nutrition low impact exercise good sleep as well as a high nutrient diet    She does have breast tenderness she is tired as well as a little bit of a feeling of motion sickness nausea          SUBJECTIVE:  Amara Son is a 32 y.o. female    No past medical history on file.  No past surgical history on file.  Review of patient's allergies indicates no known allergies.  Current Outpatient Prescriptions   Medication Sig Dispense Refill     valACYclovir (VALTREX) 1000 MG tablet Take 1,000 mg by mouth 2 (two) times a day.       No current facility-administered medications for this visit.      Family History   Problem Relation Age of Onset     Skin cancer Sister      pre-cancerous lesion     Heart disease Maternal Aunt 42     Heart disease Maternal Grandmother 60     Diabetes Maternal Grandfather      Heart disease Maternal Grandfather 70     Dementia Paternal Grandmother      Cancer Paternal Grandfather      melanoma     Social History     Social History     Marital status: Single     Spouse name: N/A     Number of children: N/A      Years of education: N/A     Social History Main Topics     Smoking status: Never Smoker     Smokeless tobacco: Never Used     Alcohol use 1.0 oz/week     2 Standard drinks or equivalent per week     Drug use: No     Sexual activity: Yes     Partners: Male     Birth control/ protection: OCP     Other Topics Concern     None     Social History Narrative    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 She has never used smokeless tobacco. She reports that she drinks about 1.0 oz of alcohol per week  She reports that she does not use illicit drugs.    REVIEW OF SYSTEMS:   Family history not pertinent to chief complaint or presenting problem    Review of systems otherwise negative as requested from patient, except   Those positive ROS outlined and discussed in [place].    OBJECTIVE:  /68 (Patient Site: Left Arm, Patient Position: Sitting, Cuff Size: Adult Regular)  Pulse (!) 116  Resp 20  Wt 135 lb (61.2 kg)  LMP 01/31/2018  SpO2 96%  Breastfeeding? No  BMI 23.91 kg/m2    GENERAL:     No acute distress.   Alert and oriented X 3         Physical:    Pleasant talkative no distress thyroid prep nontender without nodules  Lungs are clear  Cardiac S1-S2 regular sinus no appreciable murmur  No tremulousness      Recent Results (from the past 240 hour(s))   Pregnancy, Urine   Result Value Ref Range    Pregnancy Test, Urine Positive (!) Negative           ASSESSMENT & PLAN      Amara was seen today for confirmation.    Diagnoses and all orders for this visit:    Early stage of pregnancy  -     Beta-hCG, Quantitative; Standing    Possible pregnancy, not confirmed  -     Pregnancy, Urine    Early pregnancy history of a miscarriage plan we talked about risk factors for miscarriage in terms of high-intensity  training, things that raise body temperature      Plan hCG today repeat and 4 days Friday the 16th have her see Dr. Malgorzata Bentley in follow-up first OB 20 through the 30th timeline    Continue prenatal vitamin high nutrient food avoid excessive stress stay active and get good restorative sleep    No Follow-up on file.       Anticipatory Guidance and Symptomatic Cares Discussed   Advised to call back directly if there are further questions, or if these symptoms fail to improve as anticipated or worsen.  Return to clinic if patient has a clinical concern that warrants an exam.        20  Min Total Time, > 50% counseling and coordination of Care    Judha Liang MD  Family Medicine   [facility] [id number] [phone]

## 2021-06-17 NOTE — PROGRESS NOTES
Patient is a 31 yo female here to follow up on her miscarriages.    Patient's first pregnancy was unexpected in April of last year.  She was taking her OCPs faithfully, but then missed her period.  A UPT was positive and she made an appointment for confirmation here, but in the interim developed spotting.  She was seen at a clinic and they sent her for Ultrasound.  Her blood type was O POS.  Initial ultrasound showed a 6 week fetus at 9 weeks by gestational age with FHT at just 99 bpm.  Follow up ultrasound indicated fetal demise.  She saw me for follow up in May and decided to pursue cytotec for her missed miscarriage.    With this pregnancy, patient is engaged to her partner and in January, stopped her birth control to actively attempt to get pregnant.  Her LMP was 1/31/2018 and she was seen in March by Dr. Liang for pregnancy confirmation.  Due to concern with previous miscarriage, she had serial beta hCGs drawn.  It was noted that her betahCGs were not doubling as expected and an ultrasound was ordered.  The first ultrasound on March 21st showed an IUP at 6 weeks 2 days with FHTs at 128.  On 4/2/2018, patient had a second ultrasound that showed pregnancy at 7 weeks, 2 days, but no FHTs.    Patient notes that she started bleeding Friday night.  Initially it was bad, with a few clots and lots of cramping.  Now she is just spotting.  Cramps are really light.  She is concerned as this is her second consecutive miscarriage with her second pregnancy.  She is concerned that she will have fertility issues.      Family Hx:  One cousin struggled with infertility - unknown cause.  On her fiance's side:  He has one grandmother who had 2-3 miscarriages, but went on to have 5 children.    There is no history of blood clotting disorder.  There is heart disease later in life.    Gyne Hx:  Menarche: age 12-13.  Menses: q29-30 days, bleed for 5 days, with two heavy days.  Pap smear: normal.    Objective     /60 (Patient  Site: Right Arm, Patient Position: Sitting)  Pulse 93  Wt 134 lb (60.8 kg)  SpO2 100%  Breastfeeding? No  BMI 23.74 kg/m2  General appearance: alert, appears stated age and cooperative   Abdomen: soft, nontender.    Amara was seen today for miscarriage.    Diagnoses and all orders for this visit:    Miscarriage  -     Beta-hCG, Quantitative  -     Thyroid Stimulating Hormone (TSH)  -     T4, Free  -     Prolactin  -     Beta-hCG, Quantitative; Future  Discussed that two consecutive miscarriages affects 2% of the population.  Does not meet criteria for recurrent miscarriage.    Will check thyroid, prolactin and beta hcg.  Discussed basal body temperature charting in the interim along with urine ovulation kits to ensure a normal menstrual cycle.  Will have to await beta hCG < 5 in order to do other hormone testing.    Offered a follow up visit to review data, but at this time, patient notes that she has spent quite a bit of money on the multiple ultrasounds and labs for the workup of her two previous miscarriages.  She will let me know if the BBT charts are abnormal.

## 2021-06-19 NOTE — PROGRESS NOTES
Subjective:      Amara Son is being seen today for her first obstetrical visit.  This is a planned pregnancy. She is at 10w6d gestation. Her obstetrical history is significant for two previous miscarriages in the early first trimester.  Relationship with FOB: fiance, living together. Patient does intend to breast feed. Pregnancy history fully reviewed.    Prenatal symptoms:  Headaches, back aches.   Had food aversions and nausea, improved.    She is fatigued, but not too bad.      Menstrual History:  OB History      Para Term  AB Living    1 0 0 0 0 0    SAB TAB Ectopic Multiple Live Births    0 0 0 0          Patient's last menstrual period was 2018.   Very sure, she was going to do basal body temperature charting.       The following portions of the patient's history were reviewed and updated as appropriate: allergies, current medications, past family history, past medical history, past social history, past surgical history and problem list.    Review of Systems  Pertinent items are noted in HPI.  A 12 point comprehensive review of systems was negative except as noted.      Objective:        General Appearance:    Alert, cooperative, no distress, appears stated age   Head:    Normocephalic, without obvious abnormality, atraumatic   Eyes:    PERRL, conjunctiva/corneas clear, EOM's intact, fundi     benign, both eyes   Ears:    Normal TM's and external ear canals, both ears   Nose:   Nares normal, septum midline, mucosa normal, no drainage    or sinus tenderness   Throat:   Lips, mucosa, and tongue normal; teeth and gums normal   Neck:   Supple, symmetrical, trachea midline, no adenopathy;     thyroid:  no enlargement/tenderness/nodules; no carotid    bruit or JVD   Back:     Symmetric, no curvature, ROM normal, no CVA tenderness   Lungs:     Clear to auscultation bilaterally, respirations unlabored   Chest Wall:    No tenderness or deformity    Heart:    Regular rate and rhythm, S1 and S2  normal, no murmur, rub   or gallop   Breast Exam:    No tenderness, masses, or nipple abnormality   Abdomen:     Soft, non-tender, bowel sounds active all four quadrants,     no masses, no organomegaly   Genitalia:    Normal female without lesion, discharge or tenderness   Rectal:    Not performed   Extremities:   Extremities normal, atraumatic, no cyanosis or edema   Pulses:   2+ and symmetric all extremities   Skin:   Skin color, texture, turgor normal, no rashes or lesions   Lymph nodes:   Cervical, supraclavicular, and axillary nodes normal   Neurologic:   CNII-XII intact, normal strength, sensation and reflexes     throughout       Quick look ultrasound revealed a single IUP with heart rate of 165     Assessment:      Pregnancy at 10 and 6/7 weeks      Plan:      Initial labs drawn.  Prenatal vitamins.  Problem list reviewed and updated.  Would like first trimester screening - referral to [facility].  Role of ultrasound in pregnancy discussed; fetal survey: requested.    Follow up in 4 weeks.  100% of 50 min visit spent on counseling and coordination of care.

## 2021-06-19 NOTE — LETTER
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 The results of this test are as follows:    TB Skin Test (no units)   Date Value   06/12/2019 Negative     This test was negative for tuberculosis exposure per current Centers for Disease Control guidelines.    A chest x-ray was not required.    If you have any questions or concerns, please don't hesitate to call.    Sincerely,        Electronically signed by Clinical Support Staff

## 2021-06-20 NOTE — PROGRESS NOTES
Fetal Survey on Monday at [facility].    Starting to feel some movement on Saturday.  Has an anterior placenta.

## 2021-06-21 NOTE — PROGRESS NOTES
Normal fetal survey.  No remodeling.  Mother will be doing  3 days a week.  Focusing on September for .  There are insurance changes.  Will check to make sure hospital is in network.    Discussed 1 hr GTT at next visit.  Flu vaccine today.

## 2021-06-22 NOTE — PROGRESS NOTES
Assessment: Amara is here for gestational diabetes follow up visit.    She is doing well.  Eating 3 meals, 3 snacks.   Ketones negative.  Doing some walking after work as able.      FBS:  89, 87, 82, 81  1 hr BF  92, 112, 101  1 hr L  118, 120, 112,  1 hr D  126, 104, 129      Reports, eating small meals, c/o heartburn  all day long  BF   fruit/yogurt    snack:  nuts    Lunch:  sandwich or salad      snack:       Dinner:  meat/vegs/ pasta   bedtime:  yogurt, rolando milk  - wakes up at 3 am and usually needs a snack   Good variety.     Plan: GDM meal plan. Check BG and ketones as directed, f/u 3 weeks.  To call if 3 or more readings outside goal range in one week.        Provider: Malgorzata Bentley  Provider's Diagnosis (per referral form): Gestational (648.83)           158 lbs 12/19/18  OGTT:         Results for orders placed or performed in visit on 11/21/18   Glucose, Gestational Challenge 2 Hour   Result Value Ref Range     Glucose, 2 Hour 182 (H) 60-<155 mg/dL   Glucose, Gestational Challenge 1 Hour   Result Value Ref Range     Glucose, 1 Hour 191 (H) 60-<180 mg/dL   Glucose, Gestational Challenge Fasting   Result Value Ref Range     Glucose, Fasting 77 60-<95 mg/dL     Patient Fasting > 8hrs? Yes     Glucose, Gestational Challenge 3 Hour   Result Value Ref Range     Glucose, 3 hour 88 60-<140 mg/dL      EDC: Estimated Date of Delivery: 2/9/19    [facility]   (girl)  Pregnancy #: 3, 2 misc   Previous GDM: No  Medications:   Current Outpatient Medications:      Lactobacillus acidophilus (PROBIOTIC ORAL), Take 1 tablet by mouth daily., Disp: , Rfl:      omega-3/dha/epa/fish oil (FISH OIL-OMEGA-3 FATTY ACIDS) 300-1,000 mg capsule, Take 1 g by mouth daily., Disp: , Rfl:      PNV no.95/ferrous fum/folic ac (PRENATAL ORAL), Take 1 tablet by mouth daily., Disp: , Rfl:      valACYclovir (VALTREX) 1000 MG tablet, Take 500 mg by mouth at bedtime. , Disp: , Rfl:      BG monitoring goals: Fasting <95; 1 hour post start of meal <140.  Test 4 x per day.  Check fasting a.m. ketones: Yes  GDM meal pattern/carb counting taught per guidelines: Yes  Goals: Nutrition: GDM meal plan  Activity:  Walking after meals when able/staying active  Monitoring:  BG 4x/day as directed, ketones every morning        DIABETES CARE PLAN AND EDUCATION RECORD     Gestational Diabetes Disease Process/Preconception Care/Management During Pregnancy/Postpartum:     Meter (per above goals): Discussed, Literature provided and Patient returned demonstration - competent,  she is downloading BG on phone via jarad     Nutrition Management     Weight: Assessed, Discussed and Literature provided  Portions/Balance: Assessed, Discussed and Literature provided  Carb ID/Count: Assessed, Discussed and Literature provided  Label Reading: Assessed, Discussed and Literature provided  Menu Planning: Assessed, Discussed and Literature provided  Dining Out: Assessed, Discussed and Literature provided  Physical Activity: Discussed and Literature provided     Acute Complications: Prevent, Detect, Treat:     Goal Setting and Problem Solving: Assessed and Discussed  Barriers: Assessed and Discussed  Psychosocial Adjustments: Assessed and Discussed        Time: 30   MNT  Visit #: 2

## 2021-06-22 NOTE — PROGRESS NOTES
[facility] GDM Care Plan    Assessment: pt seen for initial class. She was able to check BG w/o difficulty, BG was 92 mg/dl in class.     Plan: GDM meal plan. Check BG and ketones as directed, f/u next week as scheduled.     Provider: Malgorzata Bentley  Provider's Diagnosis (per referral form): Gestational (648.83)  Weight: 156 lb 3.2 oz (70.9 kg)   OGTT:   Results for orders placed or performed in visit on 11/21/18   Glucose, Gestational Challenge 2 Hour   Result Value Ref Range    Glucose, 2 Hour 182 (H) 60-<155 mg/dL   Glucose, Gestational Challenge 1 Hour   Result Value Ref Range    Glucose, 1 Hour 191 (H) 60-<180 mg/dL   Glucose, Gestational Challenge Fasting   Result Value Ref Range    Glucose, Fasting 77 60-<95 mg/dL    Patient Fasting > 8hrs? Yes    Glucose, Gestational Challenge 3 Hour   Result Value Ref Range    Glucose, 3 hour 88 60-<140 mg/dL     EDC: Estimated Date of Delivery: 2/9/19   Pregnancy #: 3, 2 misc   Previous GDM: No  Medications:   Current Outpatient Medications:      Lactobacillus acidophilus (PROBIOTIC ORAL), Take 1 tablet by mouth daily., Disp: , Rfl:      omega-3/dha/epa/fish oil (FISH OIL-OMEGA-3 FATTY ACIDS) 300-1,000 mg capsule, Take 1 g by mouth daily., Disp: , Rfl:      PNV no.95/ferrous fum/folic ac (PRENATAL ORAL), Take 1 tablet by mouth daily., Disp: , Rfl:      valACYclovir (VALTREX) 1000 MG tablet, Take 500 mg by mouth at bedtime. , Disp: , Rfl:     BG monitoring goals: Fasting <95; 1 hour post start of meal <140. Test 4 x per day.  Check fasting a.m. ketones: Yes  GDM meal pattern/carb counting taught per guidelines: Yes  Goals: Nutrition: GDM meal plan  Activity:  Walking after meals when able/staying active  Monitoring:  BG 4x/day as directed, ketones every morning    F/u in 1 week to assess BG and food log     DIABETES CARE PLAN AND EDUCATION RECORD    Gestational Diabetes Disease Process/Preconception Care/Management During Pregnancy/Postpartum:    Meter (per above goals): Discussed,  Literature provided and Patient returned demonstration    Nutrition Management    Weight: Assessed, Discussed and Literature provided  Portions/Balance: Assessed, Discussed and Literature provided  Carb ID/Count: Assessed, Discussed and Literature provided  Label Reading: Assessed, Discussed and Literature provided  Menu Planning: Assessed, Discussed and Literature provided  Dining Out: Assessed, Discussed and Literature provided  Physical Activity: Discussed and Literature provided    Acute Complications: Prevent, Detect, Treat:    Goal Setting and Problem Solving: Assessed and Discussed  Barriers: Assessed and Discussed  Psychosocial Adjustments: Assessed and Discussed      Time: 120  Visit Type: GDM Group  Visit #: 1

## 2021-06-22 NOTE — PROGRESS NOTES
Feeling well.  Reflux has improved dramatically.    Has a cold.      Blood sugars have been good.  Had a couple spikes before Beccaria henri, but doing well.  No insulin thus far.  Has appointment with endocrinology on Wednesday.  Has BPP on 1/16/2018.

## 2021-06-22 NOTE — PROGRESS NOTES
Patient is doing well.  Feeling lots of movement.  Reviewed GDM diagnosis and how this can impact her prenatal care and delivery.    Patient has been unable to get an appointment with endocrinology.  She is following carb counting as I had discussed with her over the phone prior to Thanksgiving.  Patient would like a new referral to try to get in with endocrinology sooner.  Referral placed to [place] [facility].

## 2021-06-22 NOTE — PROGRESS NOTES
Getting more short of breath due to pregnancy.  More effort to walk.    More reflux, recommend taking ranitidine 75 mg twice a day.  Getting full after a small amount.  Now vomited twice.  Discussed smaller meals.  Sharp pain at vagina lasts just a brief second, resolves.    Got in with [facility] endocrinology.  AM glucose has been < 95.  Great postmeal.  Has follow up on Wednesday.    Referral for 36 week BPP with [facility].  Tdap today.

## 2021-06-22 NOTE — PROGRESS NOTES
Assessment: Amara is here for gestational diabetes follow up visit.    She is doing very well, feeling like she is getting enough to eat-- 3 meals, 3 snacks.   Ketones negative.  Doing some walking after work as able.    She has strong family hx diabetes.     FBS:  97, 95, 86, 90, 87  -  she often wakes up at 3-4 am and drinks milk  1 hr BF  WNL  1 hr L  WNL  1 hr D  WNL        Plan:  Continue to check FBS and one hour after start of meal and ketones as directed.  No f/u appt made at this time but to call if 3 or more readings outside goal range in one week.   Discussed strategies for decreasing risk for type 2 diabetes.      Provider: Malgorzata Bentley  Provider's Diagnosis (per referral form): Gestational (648.83)           158 lbs 12/19/18       163.2 lb   1/9/19  OGTT:             Results for orders placed or performed in visit on 11/21/18   Glucose, Gestational Challenge 2 Hour   Result Value Ref Range     Glucose, 2 Hour 182 (H) 60-<155 mg/dL   Glucose, Gestational Challenge 1 Hour   Result Value Ref Range     Glucose, 1 Hour 191 (H) 60-<180 mg/dL   Glucose, Gestational Challenge Fasting   Result Value Ref Range     Glucose, Fasting 77 60-<95 mg/dL     Patient Fasting > 8hrs? Yes     Glucose, Gestational Challenge 3 Hour   Result Value Ref Range     Glucose, 3 hour 88 60-<140 mg/dL      EDC: Estimated Date of Delivery: 2/9/19     [facility]   (girl)  Pregnancy #: 3, 2 misc   Previous GDM: No  Medications:   Current Outpatient Medications:      Lactobacillus acidophilus (PROBIOTIC ORAL), Take 1 tablet by mouth daily., Disp: , Rfl:      omega-3/dha/epa/fish oil (FISH OIL-OMEGA-3 FATTY ACIDS) 300-1,000 mg capsule, Take 1 g by mouth daily., Disp: , Rfl:      PNV no.95/ferrous fum/folic ac (PRENATAL ORAL), Take 1 tablet by mouth daily., Disp: , Rfl:      valACYclovir (VALTREX) 1000 MG tablet, Take 500 mg by mouth at bedtime. , Disp: , Rfl:      BG monitoring goals: Fasting <95; 1 hour post start of meal <140. Test 4 x per  day.  Check fasting a.m. ketones: Yes  GDM meal pattern/carb counting taught per guidelines: Yes  Goals: Nutrition: GDM meal plan  Activity:  Walking after meals when able/staying active  Monitoring:  BG 4x/day as directed, ketones every morning        DIABETES CARE PLAN AND EDUCATION RECORD     Gestational Diabetes Disease Process/Preconception Care/Management During Pregnancy/Postpartum:     Meter (per above goals): Discussed, Literature provided and Patient returned demonstration - competent,  she is downloading BG on phone via jarad     Nutrition Management     Weight: Assessed, Discussed and Literature provided - discussed post partum nutrition  Portions/Balance: Assessed, Discussed and Literature provided  Carb ID/Count: Assessed, Discussed and Literature provided  Physical Activity: Discussed and Literature provided     Acute Complications: Prevent, Detect, Treat:     Goal Setting and Problem Solving: Assessed and Discussed  Barriers: Assessed and Discussed  Psychosocial Adjustments: Assessed and Discussed        Time: 15   MNT  Visit #: 3

## 2021-06-23 NOTE — PROGRESS NOTES
ECV was unsuccessful. Plan is for discharge home and Amara will call the clinic to schedule  section. We spent some time discussing pre-op procedures and what to expect.

## 2021-06-23 NOTE — ANESTHESIA PREPROCEDURE EVALUATION
Anesthesia Evaluation      No history of anesthetic complications (No prior anesthetics. No family hx of adverse anesthetic reactions.)     Airway   Mallampati: II  Neck ROM: full   Pulmonary     breath sounds clear to auscultation  (-) asthma                         Cardiovascular   Exercise tolerance: > or = 4 METS  (-) hypertension  Rhythm: regular  Rate: normal,         Neuro/Psych - negative ROS     Endo/Other    (+) diabetes mellitus (gestational, diet controlled), pregnant ( @ 39w5d here for primary C/S for breech positioning after failed ECV)  (-) no obesity     GI/Hepatic/Renal - negative ROS           Dental - normal exam                        Anesthesia Plan  Planned anesthetic: spinal  SAB - [id number], fentanyl 15mcg   TAP block post-op    ASA 3     Anesthetic plan and risks discussed with: patient  Anesthesia plan special considerations: antiemetics,   Post-op plan: routine recovery

## 2021-06-23 NOTE — PROCEDURES
PROCEDURE NOTE - EXTERNAL CEPHALIC VERSION     NAME:  Amara Son   MRN: [id number]   : 1985     DATE OF SERVICE: 2019     PREOPERATIVE DIAGNOSIS: breech position    POSTOPERATIVE DIAGNOSIS: same as preop    PROCEDURE: non-successful external cephalic version    SURGEON:  Fady Fry     ASSISTANT: nursing staff    ANESTHESIA: none    COMPLICATIONS: none    DISPOSITION: reactive nst following, stable to home    PREOPERATIVE CONSENT: The risks of the procedure were discussed with the patient including but not exclusive to SROM, labor, fetal maternal hemorrhage, need for emergent CSection or nonurgent CSection and PTL.  Informed consent was obtained.    PROCEDURE:  After obtaining informed consent, fetal heart tones were checked and were stable with a reactive NST. Ultrasound verified breech position and location of placenta. The fetal back was identified, as well as the location of the head.  Terbutaline 0.25 mg IM was given by the RN.  The fetus was then gently rotated by putting gentle pressure in an upward motion on the buttocks, removing them from the pelvis.  Simultaneously gentle pressure was applied to the fetal occiput and the fetus was rotated from a breech position to the cephalic position in a counter-clockwise manner.  The patient tolerated this procedure well.  The patient remained in the room in stable condition for futher monitoring following th eprocedure.      Fady Fry       CC: Malgorzata Bentley MD, Fady Fry

## 2021-06-23 NOTE — ANESTHESIA PROCEDURE NOTES
Peripheral Block    Patient location during procedure: OR  post-op analgesia per surgeon order as noted in medical record  Staffing:  Performing  Anesthesiologist: Tory Boss MD  Preanesthetic Checklist  Completed: patient identified, site marked, risks, benefits, and alternatives discussed, timeout performed, consent obtained, airway assessed, oxygen available, suction available, emergency drugs available and hand hygiene performed  Peripheral Block  Block type: other, TAP  Prep: ChloraPrep  Patient position: supine  Patient monitoring: cardiac monitor, heart rate, continuous pulse oximetry and blood pressure  Laterality: bilateral, same technique used bilaterally  Injection technique: ultrasound guided    Ultrasound used to visualize needle placement in proximity to nerve being blocked: yes     Sterile gel and probe cover used for ultrasound.    Needle  Needle type: Tuohy   Needle gauge: 21 G  Needle length: 6 in  no peripheral nerve catheter placed  Assessment  Injection assessment: no difficulty with injection

## 2021-06-23 NOTE — PROGRESS NOTES
Met with Dr. Fry for consultation for external version.  Has an appointment there at 1:30 pm today.    BPP was 8/8.  EFW at 22nd percentile.  BGs are around 90.  One high fasting.

## 2021-06-23 NOTE — PROGRESS NOTES
Still having some nausea. Much improved with Ranitidine twice daily. Usually worse at night.  Having some occasional cramping, back pain. No leakage of fluid, or vaginal bleeding. Lots of fetal movement.     Fasting blood sugars typically around 90, controlled with diet. Met with endocrinology.  BPP scheduled for 1/16/2019 due to diet controlled GDM - under good control.    GBS today.   Breech presentation confirmed by bedside ultrasound. Referral to [facility] placed today.

## 2021-06-23 NOTE — PROGRESS NOTES
NST reactive BPP 8/8. Dr. Bentley notified. Will discharge home. She will F/U n clinic tomorrow with  for possible ECV

## 2021-06-23 NOTE — ANESTHESIA PROCEDURE NOTES
Spinal Block    Patient location during procedure: OR  Reason for block: primary anesthetic    Staffing:  Performing  Anesthesiologist: Tory Boss MD    Preanesthetic Checklist  Completed: patient identified, risks, benefits, and alternatives discussed, timeout performed, consent obtained, airway assessed, oxygen available, suction available, emergency drugs available and hand hygiene performed  Spinal Block  Patient position: sitting  Prep: ChloraPrep  Patient monitoring: heart rate, cardiac monitor, continuous pulse ox and blood pressure  Approach: midline  Location: L3-4  Injection technique: single-shot  Needle type: pencil-tip   Needle gauge: 24 G    Assessment  Sensory level: T10 (T6 R, T10 L)  Events: failed spinal    Additional Notes:  Uneven spinal distribution 20 min after administration. Not improved with change in position. Dense sacral block, Adequate surgical anesthesia to T10 bilaterally (T6 on R), unable to get adequate rise on L.

## 2021-06-23 NOTE — ANESTHESIA POSTPROCEDURE EVALUATION
Patient: Amaar Son  PRIMAR  SECTION FOR PREECH PRESENTATION  Anesthesia type: spinal    Patient location: PACU  Last vitals:   Vitals:    19 1510   BP: 141/68   Pulse: 80   Resp: 16   Temp:    SpO2: 100%     Post vital signs: stable  Level of consciousness: awake, alert and oriented  Post-anesthesia pain: pain controlled  Post-anesthesia nausea and vomiting: no  Pulmonary: unassisted, return to baseline  Cardiovascular: stable and blood pressure at baseline  Hydration: adequate  Anesthetic events: no    QCDR Measures:  ASA# 11 - Genny-op Cardiac Arrest: ASA11B - Patient did NOT experience unanticipated cardiac arrest  ASA# 12 - Genny-op Mortality Rate: ASA12B - Patient did NOT die  ASA# 13 - PACU Re-Intubation Rate: ASA13B - Patient did NOT require a new airway mgmt  ASA# 10 - Composite Anes Safety: ASA10A - No serious adverse event    Additional Notes:

## 2021-06-23 NOTE — ANESTHESIA CARE TRANSFER NOTE
Last vitals:   Vitals:    02/07/19 1454   BP: (P) 135/69   Pulse: (P) 100   Resp: (P) 16   Temp: (P) 37.2  C (99  F)   SpO2: (P) 100%     Patient's level of consciousness is drowsy  Spontaneous respirations: yes  Maintains airway independently: yes  Dentition unchanged: yes  Oropharynx: oropharynx clear of all foreign objects    QCDR Measures:  ASA# 20 - Surgical Safety Checklist: WHO surgical safety checklist completed prior to induction    PQRS# 430 - Adult PONV Prevention: 4558F - Pt received => 2 anti-emetic agents (different classes) preop & intraop  ASA# 8 - Peds PONV Prevention: NA - Not pediatric patient, not GA or 2 or more risk factors NOT present  PQRS# 424 - Genny-op Temp Management: 4559F - At least one body temp DOCUMENTED => 35.5C or 95.9F within required timeframe  PQRS# 426 - PACU Transfer Protocol: - Transfer of care checklist used  ASA# 14 - Acute Post-op Pain: ASA14B - Patient did NOT experience pain >= 7 out of 10

## 2021-06-23 NOTE — PROGRESS NOTES
Looking into  birth plan. Would like skin to skin as soon as possible.    Anxious about upcoming  section for breech.    Discussed  potential for future pregnancies.

## 2021-06-25 NOTE — PATIENT INSTRUCTIONS - HE
Ears    You were seen today for an infection of the middle ear, also called otitis media.    Treatment:  - Use antibiotics as prescribed until completion, even if symptoms improve  - May use tylenol or ibuprofen for pain and discomfort  - Should notice symptom improvement in the next 36-48 hours    When to come back sooner for re-evaluation?  - If symptoms have not begun improving after 72 hours of taking antibiotics  - Develop a fever or current fever worsens  - Become short of breath  - Neck stiffness  - Difficulty swallowing     Eyes    You were seen today for conjunctivitis. If you wear contact lenses, discontinue use until eye is white and no discharge for 24 hours. Lens case should be discarded and lenses subjected to overnight disinfection or replaced if disposable.    Management:  - Apply antibiotic drops as prescribed until 24 hours of no symptoms  - Use warm compresses to clear discharge and crust  - Encourage good hand hygiene with frequent hand washing  - Avoid itching or rubbing the eye    Reasons to come back:  - If symptoms have not improved in 3-5 days  - Develop excessive pus-like discharge and/or can't keep eyes open  - Develop a fever, cough, ear pain, or shortness of breath    Throat pain    Your rapid strep test was positive today. We will treat with a course of antibiotics. Please complete the full course of antibiotics. You can take your medication with food and with a probiotic such as Culturelle to prevent stomach irritation. You will be contagious for 24 hours following initiation of the medication.    You may use Tylenol or Motrin for pain and fevers.    May drink warm tea, gargle saline solution, or use throat lozenges to sooth throat pain.    Change toothbrush after 24 hours of starting the antibiotic to prevent reinfection.    Watch for resolution of symptoms in the next few days. If you continue to have high fevers, begin to have difficulty swallowing or breathing, if you notice neck  pain or difficulty moving neck, please return to clinic or present to the ER immediately.  Otherwise, follow up with your PCP as needed.

## 2021-06-25 NOTE — PROGRESS NOTES
Assessment:       Acute right otitis media   Conjunctivitis, bacterial, right  Strep pharyngitis      Plan:       Treatment: oral amoxicillin, ciprofloxacin eye drops  Probiotics  OTC analgesics discussed  Discussed eye care including disposing of contact lens case and subjecting lenses to overnight cleaning  Discussed signs of worsening symptoms and when to follow-up with PCP if no symptom improvement.    Patient Instructions   Ears    You were seen today for an infection of the middle ear, also called otitis media.    Treatment:  - Use antibiotics as prescribed until completion, even if symptoms improve  - May use tylenol or ibuprofen for pain and discomfort  - Should notice symptom improvement in the next 36-48 hours    When to come back sooner for re-evaluation?  - If symptoms have not begun improving after 72 hours of taking antibiotics  - Develop a fever or current fever worsens  - Become short of breath  - Neck stiffness  - Difficulty swallowing     Eyes    You were seen today for conjunctivitis. If you wear contact lenses, discontinue use until eye is white and no discharge for 24 hours. Lens case should be discarded and lenses subjected to overnight disinfection or replaced if disposable.    Management:  - Apply antibiotic drops as prescribed until 24 hours of no symptoms  - Use warm compresses to clear discharge and crust  - Encourage good hand hygiene with frequent hand washing  - Avoid itching or rubbing the eye    Reasons to come back:  - If symptoms have not improved in 3-5 days  - Develop excessive pus-like discharge and/or can't keep eyes open  - Develop a fever, cough, ear pain, or shortness of breath    Throat pain    Your rapid strep test was positive today. We will treat with a course of antibiotics. Please complete the full course of antibiotics. You can take your medication with food and with a probiotic such as Culturelle to prevent stomach irritation. You will be contagious for 24 hours  following initiation of the medication.    You may use Tylenol or Motrin for pain and fevers.    May drink warm tea, gargle saline solution, or use throat lozenges to sooth throat pain.    Change toothbrush after 24 hours of starting the antibiotic to prevent reinfection.    Watch for resolution of symptoms in the next few days. If you continue to have high fevers, begin to have difficulty swallowing or breathing, if you notice neck pain or difficulty moving neck, please return to clinic or present to the ER immediately.  Otherwise, follow up with your PCP as needed.              Subjective:       Amara Son is a 33 y.o. female who presents for possible ear infection. Symptoms include right ear pain, right ear drainage, right eye crusting and drainage, sore throat, and mild cough. Onset of symptoms was 3 days ago, gradually worsening since that time. Patient denies fevers. No history of recent ear infections. Patient is currently breastfeeding.    The following portions of the patient's history were reviewed and updated as appropriate: allergies, current medications and problem list.    Review of Systems  Pertinent items are noted in HPI.    Allergies  No Known Allergies       Objective:       /82 (Patient Site: Right Arm, Patient Position: Sitting, Cuff Size: Adult Regular)   Pulse (!) 113   Temp 98.2  F (36.8  C) (Oral)   Wt 153 lb 1.6 oz (69.4 kg)   LMP 05/05/2018 (Exact Date)   SpO2 99%   BMI 27.12 kg/m    General appearance: alert, appears stated age, cooperative, no distress and non-toxic  Head: Normocephalic, without obvious abnormality, atraumatic  Eyes: Right: conjunctivae erythematous, sclera injected, no discharge seen. Left: conjunctviae/sclera clear, no discharge. PERRL  Ears: Right: TM intact with purlent fluid, erythema, and bulging; no discharge seen. Left: TM intact with mucoid fluid and pressure, no erythema  Nose: no discharge  Throat: mild tonsil swelling with erythema and exudate;  MMM, lips and tongue normal  Neck: mild anterior cervical adenopathy and supple, symmetrical, trachea midline  Lungs: clear to auscultation bilaterally  Heart: regular rate and rhythm, S1, S2 normal, no murmur, click, rub or gallop

## 2021-07-14 PROBLEM — O24.410 DIET CONTROLLED GESTATIONAL DIABETES MELLITUS (GDM) IN THIRD TRIMESTER: Status: RESOLVED | Noted: 2019-02-07 | Resolved: 2019-03-25

## 2021-09-19 ENCOUNTER — HEALTH MAINTENANCE LETTER (OUTPATIENT)
Age: 36
End: 2021-09-19

## 2021-11-21 NOTE — PROGRESS NOTES
Pt here at 1345 for external cephalic version.  Pt consented and SQ Terbutaline given per Dr order.   contact guard/minimum assist (75% patients effort)

## 2022-06-25 ENCOUNTER — HEALTH MAINTENANCE LETTER (OUTPATIENT)
Age: 37
End: 2022-06-25

## 2022-11-20 ENCOUNTER — HEALTH MAINTENANCE LETTER (OUTPATIENT)
Age: 37
End: 2022-11-20

## 2023-07-08 ENCOUNTER — HEALTH MAINTENANCE LETTER (OUTPATIENT)
Age: 38
End: 2023-07-08